# Patient Record
Sex: FEMALE | Race: NATIVE HAWAIIAN OR OTHER PACIFIC ISLANDER
[De-identification: names, ages, dates, MRNs, and addresses within clinical notes are randomized per-mention and may not be internally consistent; named-entity substitution may affect disease eponyms.]

---

## 2017-09-07 ENCOUNTER — HOSPITAL ENCOUNTER (EMERGENCY)
Dept: HOSPITAL 17 - NEPD | Age: 27
Discharge: HOME | End: 2017-09-07
Payer: COMMERCIAL

## 2017-09-07 VITALS — BODY MASS INDEX: 28.8 KG/M2 | WEIGHT: 156.53 LBS | HEIGHT: 62 IN

## 2017-09-07 VITALS
DIASTOLIC BLOOD PRESSURE: 75 MMHG | RESPIRATION RATE: 17 BRPM | SYSTOLIC BLOOD PRESSURE: 121 MMHG | HEART RATE: 97 BPM | OXYGEN SATURATION: 97 %

## 2017-09-07 VITALS — DIASTOLIC BLOOD PRESSURE: 74 MMHG | SYSTOLIC BLOOD PRESSURE: 118 MMHG

## 2017-09-07 VITALS
DIASTOLIC BLOOD PRESSURE: 72 MMHG | OXYGEN SATURATION: 100 % | HEART RATE: 98 BPM | SYSTOLIC BLOOD PRESSURE: 119 MMHG | RESPIRATION RATE: 18 BRPM | TEMPERATURE: 98.6 F

## 2017-09-07 DIAGNOSIS — G89.29: ICD-10-CM

## 2017-09-07 DIAGNOSIS — R10.2: Primary | ICD-10-CM

## 2017-09-07 DIAGNOSIS — R35.0: ICD-10-CM

## 2017-09-07 LAB
ALP SERPL-CCNC: 66 U/L (ref 45–117)
ALT SERPL-CCNC: 39 U/L (ref 10–53)
ANION GAP SERPL CALC-SCNC: 6 MEQ/L (ref 5–15)
AST SERPL-CCNC: 17 U/L (ref 15–37)
BASOPHILS # BLD AUTO: 0.1 TH/MM3 (ref 0–0.2)
BASOPHILS NFR BLD: 1.1 % (ref 0–2)
BILIRUB SERPL-MCNC: 0.4 MG/DL (ref 0.2–1)
BUN SERPL-MCNC: 9 MG/DL (ref 7–18)
CHLORIDE SERPL-SCNC: 109 MEQ/L (ref 98–107)
COLOR UR: YELLOW
COMMENT (UR): (no result)
CULTURE IF INDICATED: (no result)
EOSINOPHIL # BLD: 0.3 TH/MM3 (ref 0–0.4)
EOSINOPHIL NFR BLD: 3.4 % (ref 0–4)
ERYTHROCYTE [DISTWIDTH] IN BLOOD BY AUTOMATED COUNT: 13.3 % (ref 11.6–17.2)
GFR SERPLBLD BASED ON 1.73 SQ M-ARVRAT: 81 ML/MIN (ref 89–?)
GLUCOSE UR STRIP-MCNC: (no result) MG/DL
HCO3 BLD-SCNC: 24.9 MEQ/L (ref 21–32)
HCT VFR BLD CALC: 38.7 % (ref 35–46)
HEMO FLAGS: (no result)
HGB UR QL STRIP: (no result)
KETONES UR STRIP-MCNC: (no result) MG/DL
LYMPHOCYTES # BLD AUTO: 2.9 TH/MM3 (ref 1–4.8)
LYMPHOCYTES NFR BLD AUTO: 33 % (ref 9–44)
MCH RBC QN AUTO: 27.9 PG (ref 27–34)
MCHC RBC AUTO-ENTMCNC: 33 % (ref 32–36)
MCV RBC AUTO: 84.4 FL (ref 80–100)
MONOCYTES NFR BLD: 6.4 % (ref 0–8)
MUCOUS THREADS #/AREA URNS LPF: (no result) /LPF
NEUTROPHILS # BLD AUTO: 5 TH/MM3 (ref 1.8–7.7)
NEUTROPHILS NFR BLD AUTO: 56.1 % (ref 16–70)
NITRITE UR QL STRIP: (no result)
PLATELET # BLD: 354 TH/MM3 (ref 150–450)
POTASSIUM SERPL-SCNC: 3.8 MEQ/L (ref 3.5–5.1)
RBC # BLD AUTO: 4.59 MIL/MM3 (ref 4–5.3)
SODIUM SERPL-SCNC: 140 MEQ/L (ref 136–145)
SP GR UR STRIP: 1.03 (ref 1–1.03)
SQUAMOUS #/AREA URNS HPF: 4 /HPF (ref 0–5)
WBC # BLD AUTO: 8.8 TH/MM3 (ref 4–11)

## 2017-09-07 PROCEDURE — 96361 HYDRATE IV INFUSION ADD-ON: CPT

## 2017-09-07 PROCEDURE — 99285 EMERGENCY DEPT VISIT HI MDM: CPT

## 2017-09-07 PROCEDURE — 85025 COMPLETE CBC W/AUTO DIFF WBC: CPT

## 2017-09-07 PROCEDURE — 74176 CT ABD & PELVIS W/O CONTRAST: CPT

## 2017-09-07 PROCEDURE — 81001 URINALYSIS AUTO W/SCOPE: CPT

## 2017-09-07 PROCEDURE — 87491 CHLMYD TRACH DNA AMP PROBE: CPT

## 2017-09-07 PROCEDURE — 84703 CHORIONIC GONADOTROPIN ASSAY: CPT

## 2017-09-07 PROCEDURE — 96375 TX/PRO/DX INJ NEW DRUG ADDON: CPT

## 2017-09-07 PROCEDURE — 80053 COMPREHEN METABOLIC PANEL: CPT

## 2017-09-07 PROCEDURE — 96374 THER/PROPH/DIAG INJ IV PUSH: CPT

## 2017-09-07 PROCEDURE — 87210 SMEAR WET MOUNT SALINE/INK: CPT

## 2017-09-07 PROCEDURE — 87591 N.GONORRHOEAE DNA AMP PROB: CPT

## 2017-09-07 NOTE — RADRPT
EXAM DATE/TIME:  09/07/2017 14:46 

 

[facility] COMPARISON:     

No previous studies available for comparison.

 

 

INDICATIONS :     

Left side abdominal pain

                  

 

ORAL CONTRAST:      

No oral contrast ingested.

                  

 

RADIATION DOSE:     

7.51 CTDIvol (mGy) 

 

 

MEDICAL HISTORY :     

None  

 

SURGICAL HISTORY :      

None. 

 

ENCOUNTER:      

Initial

 

ACUITY:      

2 days

 

PAIN SCALE:      

5/10

 

LOCATION:       

Left flank 

 

TECHNIQUE:     

Volumetric scanning of the abdomen and pelvis was performed.  Using automated exposure control and ad
justment of the mA and/or kV according to patient size, radiation dose was kept as low as reasonably 
achievable to obtain optimal diagnostic quality images.  DICOM format image data is available electro
nically for review and comparison.  

 

FINDINGS:     

CT Abdomen: The liver, spleen, pancreas, kidneys, adrenals are unremarkable. There is no evidence for
 any appreciable pathological adenopathy, free fluid, or bowel obstruction.  There is no evidence for
 any stones in the kidneys or the course of the ureters on either side. There is no hydronephrosis.

 

CT pelvis: There is no evidence for mass, abscess formation, or any significant adenopathy within the
 pelvis. 

 

CONCLUSION:     Essentially unremarkable study.

 

 

 

 MARCIA Giraldo MD on September 07, 2017 at 15:00           

Board Certified Radiologist.

 This report was verified electronically.

## 2017-09-07 NOTE — PD
HPI


Chief Complaint:  Gyn Problem/Complaint


Time Seen by Provider:  13:44


Travel History


International Travel<30 days:  No


Contact w/Intl Traveler<30days:  No


Traveled to known affect area:  No





History of Present Illness


HPI


This is a 27-year-old female who presents to the emergency department with left 

lower quadrant abdominal pain that's been present for 7 months, constant, 

moderate severity having worsening over the past 2 days radiating to her flank 

now.  She also reports urinary urgency and frequency.  She denies any vaginal 

discharge or vaginal bleeding.  She's been seen a gynecologist and she had a 

laparoscopy for possible endometriosis and she was told that was normal.  She 

subsequently had an ultrasound that demonstrated multiple ovarian cysts.  She's 

had an ectopic pregnancy in the past.  She says she feels nauseous but hasn't 

vomited.





PFSH


Past Medical History


Medical History:  Denies Significant Hx


Pregnant?:  Unknown


LMP:  last month





Social History


Tobacco Use:  No





Allergies-Medications


(Allergen,Severity, Reaction):  


Coded Allergies:  


     No Known Allergies (Unverified , 9/7/17)


Reported Meds & Prescriptions





Reported Meds & Active Scripts


Active


Reported


[Birth Control ]     








Review of Systems


Except as stated in HPI:  all other systems reviewed are Neg





Physical Exam


Narrative


GENERAL:Well appearing, no acute distress


SKIN: Focused skin assessment warm and dry.


HEAD: Atraumatic. Normocephalic. 


EYES: Pupils equal and round.  No injection or drainage. 


ENT:  Moist mucous membranes


NECK: Trachea midline. 


CARDIOVASCULAR: Regular rate and rhythm.  No murmur appreciated.


RESPIRATORY: Clear to auscultation. Breath sounds equal bilaterally. 


GASTROINTESTINAL: Abdomen soft, tender to palpation in the left lower quadrant 

with no rebound or guarding.


GYN: No cervical motion tenderness or adnexal tenderness, no vaginal discharge


MUSCULOSKELETAL: No obvious deformities. 


NEUROLOGICAL: Awake and alert. No obvious cranial nerve deficits.  Moving all 

extremities.


PSYCHIATRIC: Appropriate mood and affect; insight and judgment normal.





Data


Data


Last Documented VS





Vital Signs








  Date Time  Temp Pulse Resp B/P (MAP) Pulse Ox O2 Delivery O2 Flow Rate FiO2


 


9/7/17 12:59 98.6 98 18 119/72 (88) 100 Room Air  








Orders





 Orders


Complete Blood Count With Diff (9/7/17 13:55)


Comprehensive Metabolic Panel (9/7/17 13:55)


Ct Abd/Pel W/O Iv Contrast (9/7/17 )


Urinalysis - C+S If Indicated (9/7/17 13:55)


Wet Prep Profile (9/7/17 13:55)


Gc And Chlamydia Pcr (9/7/17 13:55)


Ed Urine Pregnancytest Poc (9/7/17 13:55)


Ketorolac Inj (Toradol Inj) (9/7/17 14:30)


Ondansetron Inj (Zofran Inj) (9/7/17 14:30)


Sodium Chlor 0.9% 1000 Ml Inj (Ns 1000 M (9/7/17 14:30)





Labs





Laboratory Tests








Test


  9/7/17


14:05 9/7/17


14:10


 


White Blood Count 8.8 TH/MM3  


 


Red Blood Count 4.59 MIL/MM3  


 


Hemoglobin 12.8 GM/DL  


 


Hematocrit 38.7 %  


 


Mean Corpuscular Volume 84.4 FL  


 


Mean Corpuscular Hemoglobin 27.9 PG  


 


Mean Corpuscular Hemoglobin


Concent 33.0 % 


  


 


 


Red Cell Distribution Width 13.3 %  


 


Platelet Count 354 TH/MM3  


 


Mean Platelet Volume 7.7 FL  


 


Neutrophils (%) (Auto) 56.1 %  


 


Lymphocytes (%) (Auto) 33.0 %  


 


Monocytes (%) (Auto) 6.4 %  


 


Eosinophils (%) (Auto) 3.4 %  


 


Basophils (%) (Auto) 1.1 %  


 


Neutrophils # (Auto) 5.0 TH/MM3  


 


Lymphocytes # (Auto) 2.9 TH/MM3  


 


Monocytes # (Auto) 0.6 TH/MM3  


 


Eosinophils # (Auto) 0.3 TH/MM3  


 


Basophils # (Auto) 0.1 TH/MM3  


 


CBC Comment DIFF FINAL  


 


Differential Comment   


 


Urine Color YELLOW  


 


Urine Turbidity HAZY  


 


Urine pH 6.5  


 


Urine Specific Gravity 1.030  


 


Urine Protein 30 mg/dL  


 


Urine Glucose (UA) NEG mg/dL  


 


Urine Ketones NEG mg/dL  


 


Urine Occult Blood NEG  


 


Urine Nitrite NEG  


 


Urine Bilirubin NEG  


 


Urine Urobilinogen 2.0 MG/DL  


 


Urine Leukocyte Esterase NEG  


 


Urine RBC 8 /hpf  


 


Urine WBC 3 /hpf  


 


Urine Squamous Epithelial


Cells 4 /hpf 


  


 


 


Urine Calcium Oxalate Crystals MANY /hpf  


 


Urine Mucus MANY /lpf  


 


Microscopic Urinalysis Comment


  CULT NOT


INDICATED 


 


 


Blood Urea Nitrogen 9 MG/DL  


 


Creatinine 0.85 MG/DL  


 


Random Glucose 83 MG/DL  


 


Total Protein 7.4 GM/DL  


 


Albumin 3.5 GM/DL  


 


Calcium Level 8.7 MG/DL  


 


Alkaline Phosphatase 66 U/L  


 


Aspartate Amino Transf


(AST/SGOT) 17 U/L 


  


 


 


Alanine Aminotransferase


(ALT/SGPT) 39 U/L 


  


 


 


Total Bilirubin 0.4 MG/DL  


 


Sodium Level 140 MEQ/L  


 


Potassium Level 3.8 MEQ/L  


 


Chloride Level 109 MEQ/L  


 


Carbon Dioxide Level 24.9 MEQ/L  


 


Anion Gap 6 MEQ/L  


 


Estimat Glomerular Filtration


Rate 81 ML/MIN 


  


 


 


Clue Cells (Wet Prep)  NONE SEEN 


 


Vaginal Trichomonas (Wet Prep)  NONE SEEN 


 


Vaginal Yeast (Wet Prep)  NONE SEEN 











MDM


Medical Decision Making


Medical Screen Exam Complete:  Yes


Emergency Medical Condition:  Yes


Interpretation(s)


Afebrile, mild tachycardia, normotensive


No leukocytosis


Electrolytes are reassuring


Urinalysis: No infection


wet prep negative for trichomonas and yeast


Differential Diagnosis


No leukocytosis


Electrolytes within normal limits


Urinalysis: Calcium oxalate crystals, mucus and red blood cells


Trichomonas is negative


Yeast is negative





Last 24 hours Impressions








Abdomen/Pelvis CT 9/7/17 0000 Signed





Impressions: 





 Service Date/Time:  Thursday, September 7, 2017 14:46 - CONCLUSION: 

Essentially 





 unremarkable study.     MARCIA Giraldo MD 








Narrative Course


This is a 27-year-old female who presents to the emergency department with left-

sided abdominal pain that radiates to the back, its been going on since 

February but has been worse over the past 2 days.  She does look uncomfortable 

on exam.  Pelvic exam is reassuring.  Labs are all normal.  Urinalysis is 

negative for infection.  CT abdomen and pelvis is unremarkable.  I'm not sure 

what causing this patient's chronic abdominal pain.  She's had a full GYN 

workup it might be reasonable for her to follow-up with a gastroenterologist.  

Patient was discharged on anti-inflammatories.





Diagnosis





 Primary Impression:  


 Chronic pelvic pain in female


Patient Instructions:  General Instructions





***Additional Instructions:  


If you develop severe or worsening abdominal pain, fever>100.4, persistent 

vomiting or inability to eat or drink return to the emergency department 

immediately. 


Follow up with your primary care physician in 1-2 days for a check-up.


***Med/Other Pt SpecificInfo:  Prescription(s) given, No Change to Meds


Scripts


Naproxen (Naproxen) 500 Mg Tab


500 MG PO BID Y for PAIN SCALE 4 TO 10, #20 TAB 0 Refills


   Prov: Leona Palafox MD         9/7/17


Disposition:  01 DISCHARGE HOME


Condition:  Stable











Leona Palafox MD Sep 7, 2017 14:00

## 2017-09-08 LAB
CHLAMYDIA PCR: NOT DETECTED
NEISSERIA PCR: NOT DETECTED

## 2018-01-20 ENCOUNTER — HOSPITAL ENCOUNTER (EMERGENCY)
Dept: HOSPITAL 17 - NEPC | Age: 28
LOS: 1 days | Discharge: HOME | End: 2018-01-21
Payer: COMMERCIAL

## 2018-01-20 VITALS
RESPIRATION RATE: 16 BRPM | HEART RATE: 78 BPM | DIASTOLIC BLOOD PRESSURE: 74 MMHG | SYSTOLIC BLOOD PRESSURE: 117 MMHG | OXYGEN SATURATION: 99 %

## 2018-01-20 VITALS
OXYGEN SATURATION: 98 % | SYSTOLIC BLOOD PRESSURE: 98 MMHG | RESPIRATION RATE: 16 BRPM | DIASTOLIC BLOOD PRESSURE: 64 MMHG | HEART RATE: 72 BPM

## 2018-01-20 VITALS — WEIGHT: 143.3 LBS | HEIGHT: 62 IN | BODY MASS INDEX: 26.37 KG/M2

## 2018-01-20 VITALS
OXYGEN SATURATION: 98 % | DIASTOLIC BLOOD PRESSURE: 72 MMHG | SYSTOLIC BLOOD PRESSURE: 121 MMHG | HEART RATE: 76 BPM | TEMPERATURE: 96.6 F | RESPIRATION RATE: 16 BRPM

## 2018-01-20 VITALS
DIASTOLIC BLOOD PRESSURE: 68 MMHG | OXYGEN SATURATION: 99 % | HEART RATE: 73 BPM | RESPIRATION RATE: 16 BRPM | SYSTOLIC BLOOD PRESSURE: 105 MMHG

## 2018-01-20 DIAGNOSIS — E86.0: ICD-10-CM

## 2018-01-20 DIAGNOSIS — I10: ICD-10-CM

## 2018-01-20 DIAGNOSIS — K52.9: Primary | ICD-10-CM

## 2018-01-20 DIAGNOSIS — Z98.890: ICD-10-CM

## 2018-01-20 DIAGNOSIS — Z88.8: ICD-10-CM

## 2018-01-20 DIAGNOSIS — Z88.0: ICD-10-CM

## 2018-01-20 DIAGNOSIS — Z72.0: ICD-10-CM

## 2018-01-20 DIAGNOSIS — J45.909: ICD-10-CM

## 2018-01-20 LAB
ALBUMIN SERPL-MCNC: 4.2 GM/DL (ref 3.4–5)
ALP SERPL-CCNC: 86 U/L (ref 45–117)
ALT SERPL-CCNC: 24 U/L (ref 10–53)
AST SERPL-CCNC: 20 U/L (ref 15–37)
BACTERIA #/AREA URNS HPF: (no result) /HPF
BASOPHILS # BLD AUTO: 0.1 TH/MM3 (ref 0–0.2)
BASOPHILS NFR BLD: 0.8 % (ref 0–2)
BILIRUB SERPL-MCNC: 0.7 MG/DL (ref 0.2–1)
BUN SERPL-MCNC: 10 MG/DL (ref 7–18)
CALCIUM SERPL-MCNC: 9.6 MG/DL (ref 8.5–10.1)
CHLORIDE SERPL-SCNC: 105 MEQ/L (ref 98–107)
COLOR UR: YELLOW
CREAT SERPL-MCNC: 0.89 MG/DL (ref 0.5–1)
EOSINOPHIL # BLD: 0.3 TH/MM3 (ref 0–0.4)
EOSINOPHIL NFR BLD: 2.8 % (ref 0–4)
ERYTHROCYTE [DISTWIDTH] IN BLOOD BY AUTOMATED COUNT: 14.3 % (ref 11.6–17.2)
GFR SERPLBLD BASED ON 1.73 SQ M-ARVRAT: 77 ML/MIN (ref 89–?)
GLUCOSE SERPL-MCNC: 74 MG/DL (ref 74–106)
GLUCOSE UR STRIP-MCNC: (no result) MG/DL
HCO3 BLD-SCNC: 25.1 MEQ/L (ref 21–32)
HCT VFR BLD CALC: 42 % (ref 35–46)
HGB BLD-MCNC: 13.8 GM/DL (ref 11.6–15.3)
HGB UR QL STRIP: (no result)
INR PPP: 1 RATIO
KETONES UR STRIP-MCNC: 40 MG/DL
LIPASE: 103 U/L (ref 73–393)
LYMPHOCYTES # BLD AUTO: 3.8 TH/MM3 (ref 1–4.8)
LYMPHOCYTES NFR BLD AUTO: 41.8 % (ref 9–44)
MCH RBC QN AUTO: 27.5 PG (ref 27–34)
MCHC RBC AUTO-ENTMCNC: 32.8 % (ref 32–36)
MCV RBC AUTO: 83.8 FL (ref 80–100)
MONOCYTE #: 0.5 TH/MM3 (ref 0–0.9)
MONOCYTES NFR BLD: 5.7 % (ref 0–8)
MUCOUS THREADS #/AREA URNS LPF: (no result) /LPF
NEUTROPHILS # BLD AUTO: 4.5 TH/MM3 (ref 1.8–7.7)
NEUTROPHILS NFR BLD AUTO: 48.9 % (ref 16–70)
NITRITE UR QL STRIP: (no result)
PLATELET # BLD: 270 TH/MM3 (ref 150–450)
PMV BLD AUTO: 8.3 FL (ref 7–11)
PROT SERPL-MCNC: 8 GM/DL (ref 6.4–8.2)
PROTHROMBIN TIME: 10.4 SEC (ref 9.8–11.6)
RBC # BLD AUTO: 5.01 MIL/MM3 (ref 4–5.3)
SODIUM SERPL-SCNC: 140 MEQ/L (ref 136–145)
SP GR UR STRIP: 1.03 (ref 1–1.03)
SQUAMOUS #/AREA URNS HPF: 1 /HPF (ref 0–5)
URINE LEUKOCYTE ESTERASE: (no result)
WBC # BLD AUTO: 9.1 TH/MM3 (ref 4–11)

## 2018-01-20 PROCEDURE — 99285 EMERGENCY DEPT VISIT HI MDM: CPT

## 2018-01-20 PROCEDURE — 84703 CHORIONIC GONADOTROPIN ASSAY: CPT

## 2018-01-20 PROCEDURE — 96375 TX/PRO/DX INJ NEW DRUG ADDON: CPT

## 2018-01-20 PROCEDURE — 93005 ELECTROCARDIOGRAM TRACING: CPT

## 2018-01-20 PROCEDURE — 85730 THROMBOPLASTIN TIME PARTIAL: CPT

## 2018-01-20 PROCEDURE — 83690 ASSAY OF LIPASE: CPT

## 2018-01-20 PROCEDURE — 80053 COMPREHEN METABOLIC PANEL: CPT

## 2018-01-20 PROCEDURE — 85610 PROTHROMBIN TIME: CPT

## 2018-01-20 PROCEDURE — C9113 INJ PANTOPRAZOLE SODIUM, VIA: HCPCS

## 2018-01-20 PROCEDURE — 81001 URINALYSIS AUTO W/SCOPE: CPT

## 2018-01-20 PROCEDURE — 87804 INFLUENZA ASSAY W/OPTIC: CPT

## 2018-01-20 PROCEDURE — 96361 HYDRATE IV INFUSION ADD-ON: CPT

## 2018-01-20 PROCEDURE — 85025 COMPLETE CBC W/AUTO DIFF WBC: CPT

## 2018-01-20 PROCEDURE — 74177 CT ABD & PELVIS W/CONTRAST: CPT

## 2018-01-20 PROCEDURE — 96374 THER/PROPH/DIAG INJ IV PUSH: CPT

## 2018-01-21 VITALS — HEART RATE: 68 BPM | DIASTOLIC BLOOD PRESSURE: 50 MMHG | RESPIRATION RATE: 14 BRPM | SYSTOLIC BLOOD PRESSURE: 91 MMHG

## 2018-01-21 NOTE — RADRPT
EXAM DATE/TIME:  01/20/2018 23:55 

 

[facility] COMPARISON:     

CT ABDOMEN & PELVIS W/O CONTRAST, September 07, 2017, 14:46.

 

 

INDICATIONS :     

Abdomen pain with vomiting.

                      

 

IV CONTRAST:     

90 cc Omnipaque 350 (iohexol) IV 

 

 

ORAL CONTRAST:      

No oral contrast ingested.

                      

 

RADIATION DOSE:     

8.62 CTDIvol (mGy) 

 

 

MEDICAL HISTORY :     

Hypertension.  Asthma

 

SURGICAL HISTORY :      

None. 

 

ENCOUNTER:      

Initial

 

ACUITY:      

1 week

 

PAIN SCALE:      

5/10

 

LOCATION:       

Bilateral  abdomen

 

TECHNIQUE:     

Volumetric scanning of the abdomen and pelvis was performed.  Using automated exposure control and ad
justment of the mA and/or kV according to patient size, radiation dose was kept as low as reasonably 
achievable to obtain optimal diagnostic quality images.  DICOM format image data is available electro
nically for review and comparison.  

 

FINDINGS:     

 

LOWER LUNGS:     

The visualized lower lungs are clear.

 

LIVER:     

Homogeneous density without lesion.  There is no dilation of the biliary tree.  No calcified gallston
es.

 

SPLEEN:     

Normal size without lesion.

 

PANCREAS:     

Within normal limits.

 

KIDNEYS:     

Normal in size and shape.  There is no mass, stone or hydronephrosis.

 

ADRENAL GLANDS:     

Within normal limits.

 

VASCULAR:     

There is no aortic aneurysm.

 

BOWEL/MESENTERY:     

The stomach, small bowel, and colon demonstrate no acute abnormality.  There is no free intraperitone
al air or fluid.

 

ABDOMINAL WALL:     

Within normal limits.

 

RETROPERITONEUM:     

There is no lymphadenopathy. 

 

BLADDER:     

No wall thickening or mass. 

 

REPRODUCTIVE:     

Within normal limits.

 

INGUINAL:     

There is no lymphadenopathy or hernia. 

 

MUSCULOSKELETAL:     

Within normal limits for patient age. 

 

CONCLUSION:     Normal examination.  

 

 

 

 Dipak Mcintosh MD on January 21, 2018 at 0:18           

Board Certified Radiologist.

 This report was verified electronically.

## 2018-01-21 NOTE — EKG
Date Performed: 01/20/2018       Time Performed: 20:39:54

 

PTAGE:      27 years

 

EKG:      Sinus rhythm 

 

 WITH SINUS ARRHYTHMIA NORMAL ECG 

 

NO PREVIOUS TRACING            

 

DOCTOR:   Leonel Youngblood  Interpretating Date/Time  01/21/2018 13:41:19

## 2018-12-01 NOTE — PD
HPI


Chief Complaint:  GI Complaint


Time Seen by Provider:  19:24


Travel History


International Travel<30 days:  No


Contact w/Intl Traveler<30days:  No


Traveled to known affect area:  No





History of Present Illness


HPI


27-year-old female presents to emergency department complaining of vomiting for 

1 week and diarrhea for 2 weeks.  Patient states that on January 10 she vomited 

bright red blood but has not seen blood since. Patient describes her diarrhea 

as "spongy" in nonbloody.  Patient denies hematochezia or melena. Patient 

states she has felt feverish recently.  Says she has upper abdominal pain an 

lower pelvic pain that radiates to the symphysis pubis in genital region.  

Nothing seems to improve or relieve her pain.   Says she had laparoscopic 

surgery at the end of December for ovarian cysts and endometriosis.  Patient 

states that she had abdominal pain after the surgery he does not know if this a 

continuation of that pain.  Patient had this procedure done at [facility] in 

[place].  Patient has not followed a primary care physician was not 

followed up with another physician for this problem.   Patient takes metformin 

for PCO S but denies chronic medical issues.  Patient denies recent travel, 

antibiotic use, new medications, new foods.





PFSH


Past Medical History


Asthma:  Yes


Hypertension:  Yes


Reproductive:  Yes (OVARIAN CYSTS, ENDOMETRIOSIS)


Immunizations Current:  Yes


Tetanus Vaccination:  < 5 Years


Influenza Vaccination:  No


Pregnant?:  Unknown


LMP:  1/14/18





Past Surgical History


Abdominal Surgery:  Yes (EX LAP)





Social History


Alcohol Use:  Yes (occ)


Tobacco Use:  Yes (occ)


Substance Use:  Yes (MARIJUANA)





Allergies-Medications


(Allergen,Severity, Reaction):  


Coded Allergies:  


     aspirin (Verified  Allergy, Severe, Anaphylaxis, 1/20/18)


     penicillin G (Verified  Allergy, Severe, Anaphylaxis, 1/20/18)


     No Known Allergies (Unverified  Allergy, Unknown, 1/20/18)


Reported Meds & Prescriptions





Reported Meds & Active Scripts


Active


Pepcid (Famotidine) 20 Mg Tab 20 Mg PO BID


Zofran Odt (Ondansetron Odt) 4 Mg Tab 4 Mg SL Q8HR PRN


Naproxen 500 Mg Tab 500 Mg PO BID PRN


Reported


[Birth Control ]     








Review of Systems


Except as stated in HPI:  all other systems reviewed are Neg





Physical Exam


Narrative


GENERAL: Well-developed well-nourished in mild distress


SKIN: Focused skin assessment warm/dry.  Well healing lesion to the umbilical 

consistent with patient's recent laparoscopic surgery


HEAD: Atraumatic. Normocephalic. 


EYES: Pupils equal and round. No scleral icterus. No injection or drainage. 


ENT: No nasal bleeding or discharge.  Mucous membranes pink and moist.


NECK: Trachea midline. No JVD. 


CARDIOVASCULAR: Regular rate and rhythm.  No murmur appreciated.


RESPIRATORY: No accessory muscle use. Clear to auscultation. Breath sounds 

equal bilaterally. 


GASTROINTESTINAL: Abdomen soft, non-tender, nondistended.  Mild TTP 

midepigastric and lower pelvic region


MUSCULOSKELETAL: No obvious deformities. No clubbing.  No cyanosis.  No edema.  

No CVA tenderness


NEUROLOGICAL: Awake and alert. No obvious cranial nerve deficits.  Motor 

grossly within normal limits. Normal speech.


PSYCHIATRIC: Appropriate mood and affect; insight and judgment normal.





Data


Data


Last Documented VS





Vital Signs








  Date Time  Temp Pulse Resp B/P (MAP) Pulse Ox O2 Delivery O2 Flow Rate FiO2


 


1/21/18 02:49        


 


1/21/18 01:17  68 14     


 


1/20/18 23:04     99 Room Air  


 


1/20/18 19:16 96.6       








Orders





 Orders


Urinalysis - C+S If Indicated (1/20/18 19:31)


Ed Urine Pregnancytest Poc (1/20/18 19:31)


Ondansetron Inj (Zofran Inj) (1/20/18 19:45)


Pantoprazole Inj (Protonix Inj) (1/20/18 19:45)


Complete Blood Count With Diff (1/20/18 20:11)


Comprehensive Metabolic Panel (1/20/18 20:11)


Lipase (1/20/18 20:11)


Prothrombin Time / Inr (Pt) (1/20/18 20:11)


Act Partial Throm Time (Ptt) (1/20/18 20:11)


Ct Abd/Pel W Iv Contrast(Rout) (1/20/18 20:11)


Morphine Inj (Morphine Inj) (1/20/18 20:15)


Sodium Chlor 0.9% 1000 Ml Inj (Ns 1000 M (1/20/18 20:11)


Electrocardiogram (1/20/18 20:11)


Influenzae A/B Antigen (1/20/18 20:20)


Promethazine Inj (Phenergan Inj) (1/20/18 23:00)


Iohexol 350 Inj (Omnipaque 350 Inj) (1/20/18 19:03)


Sodium Chlor 0.9% 1000 Ml Inj (Ns 1000 M (1/21/18 01:15)


Ed Discharge Order (1/21/18 02:32)





Labs





Laboratory Tests








Test


  1/20/18


19:43 1/20/18


20:39


 


Urine Color YELLOW  


 


Urine Turbidity CLEAR  


 


Urine pH 5.5  


 


Urine Specific Gravity 1.027  


 


Urine Protein 30 mg/dL  


 


Urine Glucose (UA) NEG mg/dL  


 


Urine Ketones 40 mg/dL  


 


Urine Occult Blood NEG  


 


Urine Nitrite NEG  


 


Urine Bilirubin SMALL  


 


Urine Urobilinogen 2.0 MG/DL  


 


Urine Leukocyte Esterase NEG  


 


Urine RBC 1 /hpf  


 


Urine WBC 1 /hpf  


 


Urine Squamous Epithelial


Cells 1 /hpf 


  


 


 


Urine Bacteria OCC /hpf  


 


Urine Mucus MANY /lpf  


 


Microscopic Urinalysis Comment


  CULT NOT


INDICATED 


 


 


White Blood Count  9.1 TH/MM3 


 


Red Blood Count  5.01 MIL/MM3 


 


Hemoglobin  13.8 GM/DL 


 


Hematocrit  42.0 % 


 


Mean Corpuscular Volume  83.8 FL 


 


Mean Corpuscular Hemoglobin  27.5 PG 


 


Mean Corpuscular Hemoglobin


Concent 


  32.8 % 


 


 


Red Cell Distribution Width  14.3 % 


 


Platelet Count  270 TH/MM3 


 


Mean Platelet Volume  8.3 FL 


 


Neutrophils (%) (Auto)  48.9 % 


 


Lymphocytes (%) (Auto)  41.8 % 


 


Monocytes (%) (Auto)  5.7 % 


 


Eosinophils (%) (Auto)  2.8 % 


 


Basophils (%) (Auto)  0.8 % 


 


Neutrophils # (Auto)  4.5 TH/MM3 


 


Lymphocytes # (Auto)  3.8 TH/MM3 


 


Monocytes # (Auto)  0.5 TH/MM3 


 


Eosinophils # (Auto)  0.3 TH/MM3 


 


Basophils # (Auto)  0.1 TH/MM3 


 


CBC Comment  DIFF FINAL 


 


Differential Comment   


 


Prothrombin Time  10.4 SEC 


 


Prothromb Time International


Ratio 


  1.0 RATIO 


 


 


Activated Partial


Thromboplast Time 


  22.4 SEC 


 


 


Blood Urea Nitrogen  10 MG/DL 


 


Creatinine  0.89 MG/DL 


 


Random Glucose  74 MG/DL 


 


Total Protein  8.0 GM/DL 


 


Albumin  4.2 GM/DL 


 


Calcium Level  9.6 MG/DL 


 


Alkaline Phosphatase  86 U/L 


 


Aspartate Amino Transf


(AST/SGOT) 


  20 U/L 


 


 


Alanine Aminotransferase


(ALT/SGPT) 


  24 U/L 


 


 


Total Bilirubin  0.7 MG/DL 


 


Sodium Level  140 MEQ/L 


 


Potassium Level  3.8 MEQ/L 


 


Chloride Level  105 MEQ/L 


 


Carbon Dioxide Level  25.1 MEQ/L 


 


Anion Gap  10 MEQ/L 


 


Estimat Glomerular Filtration


Rate 


  77 ML/MIN 


 


 


Lipase  103 U/L 











[facility]


Medical Decision Making


Medical Screen Exam Complete:  Yes


Emergency Medical Condition:  Yes


Differential Diagnosis


C. difficile, traveler's diarrhea, influenza, abdominal abscess, rival syndrome


Narrative Course


27-year-old female presents to emergency department complaining of vomiting for 

1 week and diarrhea for 2 weeks.  Patient states that on January 10 she vomited 

bright red blood but has not seen blood since. Patient describes her diarrhea 

as "spongy" in nonbloody.  Patient denies hematochezia or melena. Patient 

states she has felt feverish recently.  Says she has upper abdominal pain an 

lower pelvic pain that radiates to the symphysis pubis in genital region.  

Nothing seems to improve or relieve her pain.   Says she had laparoscopic 

surgery at the end of December for ovarian cysts and endometriosis.  Patient 

states that she had abdominal pain after the surgery he does not know if this a 

continuation of that pain.  Patient had this procedure done at [facility] in 

[place].  Patient has not followed a primary care physician was not 

followed up with another physician for this problem.   Patient takes metformin 

for PCO S but denies chronic medical issues.  Patient denies recent travel, 

antibiotic use, new medications, new foods. 


Vital signs stable.


Patient received Zofran, Phenergan, morphine, Protonix in the emergency 

department today.


She also received IV fluids.





Laboratory Tests








Test


  1/20/18


19:43 1/20/18


20:39


 


Urine Color YELLOW  


 


Urine Turbidity CLEAR  


 


Urine pH 5.5  


 


Urine Specific Gravity 1.027  


 


Urine Protein 30 mg/dL  


 


Urine Glucose (UA) NEG mg/dL  


 


Urine Ketones 40 mg/dL  


 


Urine Occult Blood NEG  


 


Urine Nitrite NEG  


 


Urine Bilirubin SMALL  


 


Urine Urobilinogen 2.0 MG/DL  


 


Urine Leukocyte Esterase NEG  


 


Urine RBC 1 /hpf  


 


Urine WBC 1 /hpf  


 


Urine Squamous Epithelial


Cells 1 /hpf 


  


 


 


Urine Bacteria OCC /hpf  


 


Urine Mucus MANY /lpf  


 


Microscopic Urinalysis Comment


  CULT NOT


INDICATED 


 


 


White Blood Count  9.1 TH/MM3 


 


Red Blood Count  5.01 MIL/MM3 


 


Hemoglobin  13.8 GM/DL 


 


Hematocrit  42.0 % 


 


Mean Corpuscular Volume  83.8 FL 


 


Mean Corpuscular Hemoglobin  27.5 PG 


 


Mean Corpuscular Hemoglobin


Concent 


  32.8 % 


 


 


Red Cell Distribution Width  14.3 % 


 


Platelet Count  270 TH/MM3 


 


Mean Platelet Volume  8.3 FL 


 


Neutrophils (%) (Auto)  48.9 % 


 


Lymphocytes (%) (Auto)  41.8 % 


 


Monocytes (%) (Auto)  5.7 % 


 


Eosinophils (%) (Auto)  2.8 % 


 


Basophils (%) (Auto)  0.8 % 


 


Neutrophils # (Auto)  4.5 TH/MM3 


 


Lymphocytes # (Auto)  3.8 TH/MM3 


 


Monocytes # (Auto)  0.5 TH/MM3 


 


Eosinophils # (Auto)  0.3 TH/MM3 


 


Basophils # (Auto)  0.1 TH/MM3 


 


CBC Comment  DIFF FINAL 


 


Differential Comment   


 


Prothrombin Time  10.4 SEC 


 


Prothromb Time International


Ratio 


  1.0 RATIO 


 


 


Activated Partial


Thromboplast Time 


  22.4 SEC 


 


 


Blood Urea Nitrogen  10 MG/DL 


 


Creatinine  0.89 MG/DL 


 


Random Glucose  74 MG/DL 


 


Total Protein  8.0 GM/DL 


 


Albumin  4.2 GM/DL 


 


Calcium Level  9.6 MG/DL 


 


Alkaline Phosphatase  86 U/L 


 


Aspartate Amino Transf


(AST/SGOT) 


  20 U/L 


 


 


Alanine Aminotransferase


(ALT/SGPT) 


  24 U/L 


 


 


Total Bilirubin  0.7 MG/DL 


 


Sodium Level  140 MEQ/L 


 


Potassium Level  3.8 MEQ/L 


 


Chloride Level  105 MEQ/L 


 


Carbon Dioxide Level  25.1 MEQ/L 


 


Anion Gap  10 MEQ/L 


 


Estimat Glomerular Filtration


Rate 


  77 ML/MIN 


 


 


Lipase  103 U/L 





Labs appears stable.


CT and dispo pending as of transfer of care to Dr. Bowling. Please see his 

note for more information.


Scripts


Famotidine (Pepcid) 20 Mg Tab


20 MG PO BID, #60 TAB 0 Refills


   Prov: Kyler Bowling MD         1/21/18 


Ondansetron Odt (Zofran Odt) 4 Mg Tab


4 MG SL Q8HR Y for Nausea/Vomiting, #30 TAB 0 Refills


   Prov: Kyler Bowling MD         1/21/18


Condition:  Stable











Ana Jacques Jan 20, 2018 20:18
Physical Exam


Narrative


Patient signed out to me at shift change by FRANCES Perez, pending CT abdomen and 

pelvis.  Patient has a history of having diarrhea for the past 3 weeks, patient'

s significant other has diarrhea predating that for at least a month.  Patient 

has no history of antibiotic use, no international travel.  Patient significant 

other diarrhea sounds very similar to this patient's presentation.  Patient did 

have a vomiting episode with some blood production on January 6, has vomited 

since but has not had any hematemesis.  Patient has had no melena or 

hematochezia by history.


Laboratory examinations reviewed, no significant abnormalities.  CT abdomen and 

pelvis as read by radiology has no significant abnormalities.  Patient received 

IV fluids, is now tolerating by mouth and feels largely improved.  Prolonged 

discussion with patient and patient's significant other, patient will be given 

materials to collect stool at home for stool O&P and culture as an outpatient.  

No current treatment indicated.





Data


Data


Last Documented VS





Vital Signs








  Date Time  Temp Pulse Resp B/P (MAP) Pulse Ox O2 Delivery O2 Flow Rate FiO2


 


1/21/18 01:17  68 14 91/50 (64)    


 


1/20/18 23:04     99 Room Air  


 


1/20/18 19:16 96.6       








Orders





 Orders


Urinalysis - C+S If Indicated (1/20/18 19:31)


Ed Urine Pregnancytest Poc (1/20/18 19:31)


Ondansetron Inj (Zofran Inj) (1/20/18 19:45)


Pantoprazole Inj (Protonix Inj) (1/20/18 19:45)


Complete Blood Count With Diff (1/20/18 20:11)


Comprehensive Metabolic Panel (1/20/18 20:11)


Lipase (1/20/18 20:11)


Prothrombin Time / Inr (Pt) (1/20/18 20:11)


Act Partial Throm Time (Ptt) (1/20/18 20:11)


Ct Abd/Pel W Iv Contrast(Rout) (1/20/18 20:11)


Morphine Inj (Morphine Inj) (1/20/18 20:15)


Sodium Chlor 0.9% 1000 Ml Inj (Ns 1000 M (1/20/18 20:11)


Electrocardiogram (1/20/18 20:11)


Influenzae A/B Antigen (1/20/18 20:20)


Promethazine Inj (Phenergan Inj) (1/20/18 23:00)


Iohexol 350 Inj (Omnipaque 350 Inj) (1/20/18 19:03)


Sodium Chlor 0.9% 1000 Ml Inj (Ns 1000 M (1/21/18 01:15)





Labs





Laboratory Tests








Test


  1/20/18


19:43 1/20/18


20:39


 


Urine Color YELLOW  


 


Urine Turbidity CLEAR  


 


Urine pH 5.5  


 


Urine Specific Gravity 1.027  


 


Urine Protein 30 mg/dL  


 


Urine Glucose (UA) NEG mg/dL  


 


Urine Ketones 40 mg/dL  


 


Urine Occult Blood NEG  


 


Urine Nitrite NEG  


 


Urine Bilirubin SMALL  


 


Urine Urobilinogen 2.0 MG/DL  


 


Urine Leukocyte Esterase NEG  


 


Urine RBC 1 /hpf  


 


Urine WBC 1 /hpf  


 


Urine Squamous Epithelial


Cells 1 /hpf 


  


 


 


Urine Bacteria OCC /hpf  


 


Urine Mucus MANY /lpf  


 


Microscopic Urinalysis Comment


  CULT NOT


INDICATED 


 


 


White Blood Count  9.1 TH/MM3 


 


Red Blood Count  5.01 MIL/MM3 


 


Hemoglobin  13.8 GM/DL 


 


Hematocrit  42.0 % 


 


Mean Corpuscular Volume  83.8 FL 


 


Mean Corpuscular Hemoglobin  27.5 PG 


 


Mean Corpuscular Hemoglobin


Concent 


  32.8 % 


 


 


Red Cell Distribution Width  14.3 % 


 


Platelet Count  270 TH/MM3 


 


Mean Platelet Volume  8.3 FL 


 


Neutrophils (%) (Auto)  48.9 % 


 


Lymphocytes (%) (Auto)  41.8 % 


 


Monocytes (%) (Auto)  5.7 % 


 


Eosinophils (%) (Auto)  2.8 % 


 


Basophils (%) (Auto)  0.8 % 


 


Neutrophils # (Auto)  4.5 TH/MM3 


 


Lymphocytes # (Auto)  3.8 TH/MM3 


 


Monocytes # (Auto)  0.5 TH/MM3 


 


Eosinophils # (Auto)  0.3 TH/MM3 


 


Basophils # (Auto)  0.1 TH/MM3 


 


CBC Comment  DIFF FINAL 


 


Differential Comment   


 


Prothrombin Time  10.4 SEC 


 


Prothromb Time International


Ratio 


  1.0 RATIO 


 


 


Activated Partial


Thromboplast Time 


  22.4 SEC 


 


 


Blood Urea Nitrogen  10 MG/DL 


 


Creatinine  0.89 MG/DL 


 


Random Glucose  74 MG/DL 


 


Total Protein  8.0 GM/DL 


 


Albumin  4.2 GM/DL 


 


Calcium Level  9.6 MG/DL 


 


Alkaline Phosphatase  86 U/L 


 


Aspartate Amino Transf


(AST/SGOT) 


  20 U/L 


 


 


Alanine Aminotransferase


(ALT/SGPT) 


  24 U/L 


 


 


Total Bilirubin  0.7 MG/DL 


 


Sodium Level  140 MEQ/L 


 


Potassium Level  3.8 MEQ/L 


 


Chloride Level  105 MEQ/L 


 


Carbon Dioxide Level  25.1 MEQ/L 


 


Anion Gap  10 MEQ/L 


 


Estimat Glomerular Filtration


Rate 


  77 ML/MIN 


 


 


Lipase  103 U/L 











MDM


Medical Record Reviewed:  Yes


Supervised Visit with LUCI:  Yes


Differential Diagnosis


Acute gastroenteritis, infectious diarrhea, clinical dehydration


Narrative Course


see narrative.


Diagnosis





 Primary Impression:  


 Gastroenteritis


 Additional Impression:  


 Dehydration


Patient Instructions:  Dehydration (ED), Gastroenteritis (ED), General 

Instructions





***Additional Instruction:  


Zofran 4 mg ODT for nausea/vomiting.  Pepcid 20 mg twice daily for increased 

acid production in stomach.  Collect stool sample as discussed, bring to her 

doctor for studies.  Drink plenty of fluids, advance diet slowly as tolerated.  

Avoid dairy and spicy foods.  Return promptly for worsening


Scripts


Famotidine (Pepcid) 20 Mg Tab


20 MG PO BID, #60 TAB 0 Refills


   Prov: Kyler Bowling MD         1/21/18 


Ondansetron Odt (Zofran Odt) 4 Mg Tab


4 MG SL Q8HR Y for Nausea/Vomiting, #30 TAB 0 Refills


   Prov: Kyler Bowling MD         1/21/18


Disposition:  01 DISCHARGE HOME


Condition:  Stable











Kyler Bowling MD Jan 21, 2018 02:32
2 = assistive person